# Patient Record
Sex: MALE | Race: WHITE | NOT HISPANIC OR LATINO | Employment: FULL TIME | URBAN - METROPOLITAN AREA
[De-identification: names, ages, dates, MRNs, and addresses within clinical notes are randomized per-mention and may not be internally consistent; named-entity substitution may affect disease eponyms.]

---

## 2018-10-18 ENCOUNTER — HOSPITAL ENCOUNTER (OUTPATIENT)
Dept: OTHER | Facility: HOSPITAL | Age: 28
Discharge: HOME | End: 2018-10-18

## 2020-11-18 ENCOUNTER — HOSPITAL ENCOUNTER (OUTPATIENT)
Dept: OTHER | Facility: HOSPITAL | Age: 30
Discharge: HOME | End: 2020-11-18

## 2024-07-23 ENCOUNTER — PATIENT ROUNDING (BHMG ONLY) (OUTPATIENT)
Dept: NEUROLOGY | Facility: CLINIC | Age: 34
End: 2024-07-23
Payer: COMMERCIAL

## 2024-07-23 ENCOUNTER — LAB (OUTPATIENT)
Dept: LAB | Facility: HOSPITAL | Age: 34
End: 2024-07-23
Payer: COMMERCIAL

## 2024-07-23 ENCOUNTER — OFFICE VISIT (OUTPATIENT)
Dept: NEUROLOGY | Facility: CLINIC | Age: 34
End: 2024-07-23
Payer: COMMERCIAL

## 2024-07-23 VITALS
BODY MASS INDEX: 36.57 KG/M2 | DIASTOLIC BLOOD PRESSURE: 85 MMHG | WEIGHT: 285 LBS | SYSTOLIC BLOOD PRESSURE: 157 MMHG | HEART RATE: 60 BPM | HEIGHT: 74 IN

## 2024-07-23 DIAGNOSIS — G35 MULTIPLE SCLEROSIS: Primary | ICD-10-CM

## 2024-07-23 DIAGNOSIS — G35 MULTIPLE SCLEROSIS: ICD-10-CM

## 2024-07-23 LAB
ALBUMIN SERPL-MCNC: 4.2 G/DL (ref 3.5–5.2)
ALBUMIN/GLOB SERPL: 1.4 G/DL
ALP SERPL-CCNC: 103 U/L (ref 39–117)
ALT SERPL W P-5'-P-CCNC: 34 U/L (ref 1–41)
ANION GAP SERPL CALCULATED.3IONS-SCNC: 12 MMOL/L (ref 5–15)
APTT PPP: 29.5 SECONDS (ref 24.2–34.2)
AST SERPL-CCNC: 22 U/L (ref 1–40)
BASOPHILS # BLD AUTO: 0.06 10*3/MM3 (ref 0–0.2)
BASOPHILS NFR BLD AUTO: 0.8 % (ref 0–1.5)
BILIRUB SERPL-MCNC: 0.7 MG/DL (ref 0–1.2)
BUN SERPL-MCNC: 9 MG/DL (ref 6–20)
BUN/CREAT SERPL: 9.1 (ref 7–25)
CALCIUM SPEC-SCNC: 9.1 MG/DL (ref 8.6–10.5)
CHLORIDE SERPL-SCNC: 102 MMOL/L (ref 98–107)
CO2 SERPL-SCNC: 23 MMOL/L (ref 22–29)
CREAT SERPL-MCNC: 0.99 MG/DL (ref 0.76–1.27)
DEPRECATED RDW RBC AUTO: 41.2 FL (ref 37–54)
EGFRCR SERPLBLD CKD-EPI 2021: 103.2 ML/MIN/1.73
EOSINOPHIL # BLD AUTO: 0.51 10*3/MM3 (ref 0–0.4)
EOSINOPHIL NFR BLD AUTO: 6.7 % (ref 0.3–6.2)
ERYTHROCYTE [DISTWIDTH] IN BLOOD BY AUTOMATED COUNT: 13.3 % (ref 12.3–15.4)
GLOBULIN UR ELPH-MCNC: 2.9 GM/DL
GLUCOSE SERPL-MCNC: 237 MG/DL (ref 65–99)
HCT VFR BLD AUTO: 46.3 % (ref 37.5–51)
HGB BLD-MCNC: 15.4 G/DL (ref 13–17.7)
IGG1 SER-MCNC: 1148 MG/DL (ref 700–1600)
IMM GRANULOCYTES # BLD AUTO: 0.03 10*3/MM3 (ref 0–0.05)
IMM GRANULOCYTES NFR BLD AUTO: 0.4 % (ref 0–0.5)
INR PPP: 1 (ref 0.86–1.15)
LYMPHOCYTES # BLD AUTO: 2.97 10*3/MM3 (ref 0.7–3.1)
LYMPHOCYTES NFR BLD AUTO: 39 % (ref 19.6–45.3)
MCH RBC QN AUTO: 28.4 PG (ref 26.6–33)
MCHC RBC AUTO-ENTMCNC: 33.3 G/DL (ref 31.5–35.7)
MCV RBC AUTO: 85.4 FL (ref 79–97)
MONOCYTES # BLD AUTO: 0.41 10*3/MM3 (ref 0.1–0.9)
MONOCYTES NFR BLD AUTO: 5.4 % (ref 5–12)
NEUTROPHILS NFR BLD AUTO: 3.63 10*3/MM3 (ref 1.7–7)
NEUTROPHILS NFR BLD AUTO: 47.7 % (ref 42.7–76)
NRBC BLD AUTO-RTO: 0 /100 WBC (ref 0–0.2)
PLATELET # BLD AUTO: 350 10*3/MM3 (ref 140–450)
PMV BLD AUTO: 9.4 FL (ref 6–12)
POTASSIUM SERPL-SCNC: 3.7 MMOL/L (ref 3.5–5.2)
PROT SERPL-MCNC: 7.1 G/DL (ref 6–8.5)
PROTHROMBIN TIME: 13.4 SECONDS (ref 11.8–14.9)
RBC # BLD AUTO: 5.42 10*6/MM3 (ref 4.14–5.8)
SODIUM SERPL-SCNC: 137 MMOL/L (ref 136–145)
WBC NRBC COR # BLD AUTO: 7.61 10*3/MM3 (ref 3.4–10.8)

## 2024-07-23 PROCEDURE — 80053 COMPREHEN METABOLIC PANEL: CPT

## 2024-07-23 PROCEDURE — 86051 AQUAPORIN-4 ANTB ELISA: CPT

## 2024-07-23 PROCEDURE — 86362 MOG-IGG1 ANTB CBA EACH: CPT

## 2024-07-23 PROCEDURE — 86706 HEP B SURFACE ANTIBODY: CPT

## 2024-07-23 PROCEDURE — 36415 COLL VENOUS BLD VENIPUNCTURE: CPT

## 2024-07-23 PROCEDURE — 87340 HEPATITIS B SURFACE AG IA: CPT

## 2024-07-23 PROCEDURE — 85610 PROTHROMBIN TIME: CPT

## 2024-07-23 PROCEDURE — 85025 COMPLETE CBC W/AUTO DIFF WBC: CPT

## 2024-07-23 PROCEDURE — 85730 THROMBOPLASTIN TIME PARTIAL: CPT

## 2024-07-23 PROCEDURE — 82784 ASSAY IGA/IGD/IGG/IGM EACH: CPT

## 2024-07-23 PROCEDURE — 86704 HEP B CORE ANTIBODY TOTAL: CPT

## 2024-07-23 NOTE — PROGRESS NOTES
"Chief Complaint  Neurologic Problem (Evaluate for MS, MRI w/wo 2020 Brain. )    Subjective          Thiago Arevalo is a 34 y.o. male who presents to [facility]OLOG[facility] & NEUROSURGERY  History of Present Illness  34-year-old man evaluated for multiple sclerosis.  He states that he had sclerosis since he lost vision in his left eye in 2016 and he had problems seeing below which was like looking through static and that lasted for 2 years before he lost vision in his right eye.  In 2018 he lost vision in his right eye and became blind he can see some movement and light.  In 2012 13 his right side lost strength in his left side felt like there was cold sensation does intense.  He has never been treated for multiple sclerosis and he was seeing another neurologist in the past.  MRI of the cervical spine that was performed in 2018 shows scattered tiny hyperintense T2 signal lesions throughout the cervical cord suggesting of a chronic multiple sclerosis plaque.  MRI of the brain November 2020 shows findings consistent with provided clinical diagnosis of multiple sclerosis.  There is 1 punctuate focus of enhancement in the right posterior frontal centrum semiovale consistent with a focus of active demyelination.    He states that he is independent with activities of daily living.  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  His wife is with him today.  They have no children.    Objective   Vital Signs:   /85   Pulse 60   Ht 188 cm (74.02\")   Wt 129 kg (285 lb)   BMI 36.58 kg/m²     Physical Exam   He is alert, fluent, phasic, follows commands well.  Optic disk is pale in the right side with afferent pupillary defect.  Optic disc the left side is pale as well but there is no afferent pupillary defect and pupils are equal briskly reactive to light.  There is exotropia of the right eye.  EOMs are full, visual fields full, facial strength is full, soft palate elevation and " tongue are normal.  There is no weakness of the upper or lower extremities.  Fine finger movements are intact.  Reflex symmetrically decreased.  Station gait is able to tiptoe, heel walk, melvi and has difficulty with tandem.  Heart is regular and rhythm normal in rate.        Assessment and Plan  Diagnoses and all orders for this visit:    1. Multiple sclerosis (Primary)  -     NMO IgG Autoantibodies; Future  -     aPTT; Future  -     Protime-INR; Future  -     Anti-Myelin Oligodendrocyte Glycoprotein (MOG), Serum; Future  -     CBC & Differential; Future  -     Comprehensive Metabolic Panel; Future  -     Hepatitis B Virus (HBV) Screening and Diagnosis; Future  -     IgG; Future  -     IR Lumbar Puncture Diagnosis; Future  -     Obtain Informed Consent; Standing  -     Notify Provider if Patient Taking Blood Thinning Agents; Standing  -     CBC (No Diff); Standing  -     Glucose, CSF - Cerebrospinal Fluid, Lumbar Puncture; Standing  -     Protein, CSF - Cerebrospinal Fluid, Lumbar Puncture; Standing  -     Cell Count With Differential, CSF Use CSF Tube: 1; Standing  -     Cell Count With Differential, CSF; Standing  -     CSF Tube - Cerebrospinal Fluid, Lumbar Puncture; Standing  -     CSF Tube - Cerebrospinal Fluid, Lumbar Puncture; Standing  -     MS Profile & MBP, CSF and Serum - Cerebrospinal Fluid, Lumbar Puncture; Standing  -     Oligoclonal Banding (COLLECT RED TUBE); Standing         Total time spent with the patient and coordinating patient care was 60 minutes.    Follow Up  No follow-ups on file.  Patient was given instructions and counseling regarding his condition or for health maintenance advice. Please see specific information pulled into the AVS if appropriate.

## 2024-07-23 NOTE — ASSESSMENT & PLAN NOTE
He has untreated multiple sclerosis.  I discussed with him that I will repeat an MRI of the brain, cervical spine and thoracic spine and obtain a lumbar puncture under fluoroscopy.  I explained to him the complications including post spinal headache requiring an epidural blood patch and almost 0 possibility of spinal complications including paralysis, CSF leak, CNS infection and death.  I will obtain laboratory workup and I will see him again after this is perform and start him on Kesimpta.  I discussed with him and his wife that he should not be overheated since it may exacerbate an attack.  Thank you for let me participate in his care.

## 2024-07-24 LAB
HBV CORE AB SERPL QL IA: NEGATIVE
HBV SURFACE AB SER QL: REACTIVE
HBV SURFACE AG SERPL QL IA: NEGATIVE
IMP & REVIEW OF LAB RESULTS: NORMAL
LABORATORY COMMENT REPORT: NORMAL

## 2024-07-25 LAB — MOG AB SER QL CBA IFA: NEGATIVE

## 2024-07-26 LAB — AQP4 H2O CHANNEL IGG SERPL IA-ACNC: <1.5 U/ML (ref 0–3)

## 2024-08-08 ENCOUNTER — LAB (OUTPATIENT)
Dept: LAB | Facility: HOSPITAL | Age: 34
End: 2024-08-08
Payer: COMMERCIAL

## 2024-08-08 ENCOUNTER — HOSPITAL ENCOUNTER (OUTPATIENT)
Dept: INTERVENTIONAL RADIOLOGY/VASCULAR | Facility: HOSPITAL | Age: 34
Discharge: HOME | End: 2024-08-08
Payer: COMMERCIAL

## 2024-08-08 VITALS
HEART RATE: 61 BPM | DIASTOLIC BLOOD PRESSURE: 89 MMHG | OXYGEN SATURATION: 95 % | SYSTOLIC BLOOD PRESSURE: 145 MMHG | RESPIRATION RATE: 20 BRPM

## 2024-08-08 DIAGNOSIS — G35 MULTIPLE SCLEROSIS: ICD-10-CM

## 2024-08-08 LAB
APPEARANCE CSF: CLEAR
APPEARANCE CSF: CLEAR
COLOR CSF: COLORLESS
COLOR CSF: COLORLESS
DEPRECATED RDW RBC AUTO: 40.4 FL (ref 37–54)
ERYTHROCYTE [DISTWIDTH] IN BLOOD BY AUTOMATED COUNT: 13.1 % (ref 12.3–15.4)
GLUCOSE CSF-MCNC: 93 MG/DL (ref 40–70)
HCT VFR BLD AUTO: 44.1 % (ref 37.5–51)
HGB BLD-MCNC: 14.7 G/DL (ref 13–17.7)
HOLD SPECIMEN: NORMAL
HOLD SPECIMEN: NORMAL
MCH RBC QN AUTO: 28.5 PG (ref 26.6–33)
MCHC RBC AUTO-ENTMCNC: 33.3 G/DL (ref 31.5–35.7)
MCV RBC AUTO: 85.6 FL (ref 79–97)
NUC CELL # CSF MANUAL: 2.22 /MM3 (ref 0–5)
NUC CELL # CSF MANUAL: 3.33 /MM3 (ref 0–5)
PLATELET # BLD AUTO: 312 10*3/MM3 (ref 140–450)
PMV BLD AUTO: 8.7 FL (ref 6–12)
PROT CSF-MCNC: 25.7 MG/DL (ref 15–45)
RBC # BLD AUTO: 5.15 10*6/MM3 (ref 4.14–5.8)
RBC # CSF MANUAL: 0 /MM3
RBC # CSF MANUAL: 7.78 /MM3
TUBE # CSF: 1
TUBE # CSF: 4
WBC NRBC COR # BLD AUTO: 7.67 10*3/MM3 (ref 3.4–10.8)
XANTHOCHROMIA FLD QL: NORMAL
XANTHOCHROMIA FLD QL: NORMAL

## 2024-08-08 PROCEDURE — 83873 ASSAY OF CSF PROTEIN: CPT | Performed by: PSYCHIATRY & NEUROLOGY

## 2024-08-08 PROCEDURE — 82784 ASSAY IGA/IGD/IGG/IGM EACH: CPT | Performed by: PSYCHIATRY & NEUROLOGY

## 2024-08-08 PROCEDURE — 83916 OLIGOCLONAL BANDS: CPT | Performed by: PSYCHIATRY & NEUROLOGY

## 2024-08-08 PROCEDURE — 89050 BODY FLUID CELL COUNT: CPT | Performed by: PSYCHIATRY & NEUROLOGY

## 2024-08-08 PROCEDURE — 85027 COMPLETE CBC AUTOMATED: CPT | Performed by: PSYCHIATRY & NEUROLOGY

## 2024-08-08 PROCEDURE — 82945 GLUCOSE OTHER FLUID: CPT | Performed by: PSYCHIATRY & NEUROLOGY

## 2024-08-08 PROCEDURE — 84157 ASSAY OF PROTEIN OTHER: CPT | Performed by: PSYCHIATRY & NEUROLOGY

## 2024-08-08 PROCEDURE — 82040 ASSAY OF SERUM ALBUMIN: CPT | Performed by: PSYCHIATRY & NEUROLOGY

## 2024-08-08 PROCEDURE — 82042 OTHER SOURCE ALBUMIN QUAN EA: CPT | Performed by: PSYCHIATRY & NEUROLOGY

## 2024-08-08 RX ORDER — LIDOCAINE HYDROCHLORIDE 20 MG/ML
20 INJECTION, SOLUTION INFILTRATION; PERINEURAL ONCE
Status: COMPLETED | OUTPATIENT
Start: 2024-08-08 | End: 2024-08-08

## 2024-08-08 RX ADMIN — LIDOCAINE HYDROCHLORIDE 9 ML: 20 INJECTION, SOLUTION INFILTRATION; PERINEURAL at 10:41

## 2024-08-08 RX ADMIN — SODIUM BICARBONATE 1 ML: 84 INJECTION, SOLUTION INTRAVENOUS at 10:41

## 2024-08-13 LAB
ALB CSF/SERPL: 4 {RATIO} (ref 0–8)
ALBUMIN CSF-MCNC: 18 MG/DL (ref 10–45)
ALBUMIN SERPL-MCNC: 4.5 G/DL (ref 4.1–5.1)
IGG CSF-MCNC: 4.4 MG/DL (ref 0–10.3)
IGG SERPL-MCNC: 1199 MG/DL (ref 603–1613)
IGG SYNTH RATE SER+CSF CALC-MRATE: 6.7 MG/DAY
IGG/ALB CLEAR SER+CSF-RTO: 0.9 (ref 0–0.7)
IGG/ALB CSF: 0.24 {RATIO} (ref 0–0.25)
MBP CSF-MCNC: 1.5 NG/ML (ref 0–3.8)
OLIGOCLONAL BANDS.IT SER+CSF QL: ABNORMAL

## 2024-08-16 ENCOUNTER — HOSPITAL ENCOUNTER (OUTPATIENT)
Dept: MRI IMAGING | Facility: HOSPITAL | Age: 34
Discharge: HOME | End: 2024-08-16
Payer: COMMERCIAL

## 2024-08-16 DIAGNOSIS — G35 MULTIPLE SCLEROSIS: ICD-10-CM

## 2024-08-16 PROCEDURE — 0 GADOBENATE DIMEGLUMINE 529 MG/ML SOLUTION: Performed by: PSYCHIATRY & NEUROLOGY

## 2024-08-16 PROCEDURE — 72156 MRI NECK SPINE W/O & W/DYE: CPT

## 2024-08-16 PROCEDURE — A9577 INJ MULTIHANCE: HCPCS | Performed by: PSYCHIATRY & NEUROLOGY

## 2024-08-16 PROCEDURE — 72157 MRI CHEST SPINE W/O & W/DYE: CPT

## 2024-08-16 PROCEDURE — 70553 MRI BRAIN STEM W/O & W/DYE: CPT

## 2024-08-16 RX ADMIN — GADOBENATE DIMEGLUMINE 20 ML: 529 INJECTION, SOLUTION INTRAVENOUS at 15:05

## 2024-08-23 ENCOUNTER — OFFICE VISIT (OUTPATIENT)
Dept: NEUROLOGY | Facility: CLINIC | Age: 34
End: 2024-08-23
Payer: COMMERCIAL

## 2024-08-23 ENCOUNTER — PRIOR AUTHORIZATION (OUTPATIENT)
Dept: NEUROLOGY | Facility: CLINIC | Age: 34
End: 2024-08-23

## 2024-08-23 VITALS
DIASTOLIC BLOOD PRESSURE: 72 MMHG | SYSTOLIC BLOOD PRESSURE: 149 MMHG | BODY MASS INDEX: 36.45 KG/M2 | WEIGHT: 284 LBS | HEIGHT: 74 IN | HEART RATE: 67 BPM

## 2024-08-23 DIAGNOSIS — G35 MULTIPLE SCLEROSIS: Primary | ICD-10-CM

## 2024-08-23 PROCEDURE — 99214 OFFICE O/P EST MOD 30 MIN: CPT | Performed by: PSYCHIATRY & NEUROLOGY

## 2024-08-23 RX ORDER — OFATUMUMAB 20 MG/.4ML
20 INJECTION, SOLUTION SUBCUTANEOUS TAKE AS DIRECTED
Qty: 1.2 ML | Refills: 0 | COMMUNITY
Start: 2024-08-23

## 2024-08-23 NOTE — ASSESSMENT & PLAN NOTE
I discussed with him regarding the findings of relapsing remitting multiple sclerosis clinically and he has radiological and CSF evidence of multiple sclerosis.  I josiah with him that I will start him on Kesimpta and discussed with him regarding the administration and adverse effects.  They are worse adverse effects is related to increased risk of infection, suppression of his bone marrow, hyper sensitivity reactions.  We will follow his CBC and comprehensive medical profile in 2 months.  He was given instructions by our nurse how to self administer the medication and he was given the first shot of Kesimpta.  I will see him again in 2 months time for follow-up.

## 2024-08-23 NOTE — PROGRESS NOTES
"Chief Complaint  Results (LP & imaging results. )    Subjective          Thiago Arevalo is a 34 y.o. male who presents to [facility] NEUROLOGY & NEUROSURGERY  History of Present Illness  33-year-old man here for follow-up of his multiple sclerosis.  I reviewed the MRI of the brain, cervical spine and thoracic spine and there are no active lesions.  He had laboratory workup which his hepatitis B is unremarkable.  IgG is normal.  He had 6 bands in the spinal fluid.    He has not had an exacerbation or an attack since he went blind in his right eye.    Objective   Vital Signs:   /72   Pulse 67   Ht 188 cm (74.02\")   Wt 129 kg (284 lb)   BMI 36.45 kg/m²     Physical Exam   Alert, fluent, phasic, follows commands well.  Heart is regular rhythm normal in rate.        Assessment and Plan  Diagnoses and all orders for this visit:    1. Multiple sclerosis (Primary)  Assessment & Plan:  I discussed with him regarding the findings of relapsing remitting multiple sclerosis clinically and he has radiological and CSF evidence of multiple sclerosis.  I josiah with him that I will start him on Kesimpta and discussed with him regarding the administration and adverse effects.  They are worse adverse effects is related to increased risk of infection, suppression of his bone marrow, hyper sensitivity reactions.  We will follow his CBC and comprehensive medical profile in 2 months.  He was given instructions by our nurse how to self administer the medication and he was given the first shot of Kesimpta.  I will see him again in 2 months time for follow-up.           Total time spent with the patient and coordinating patient care was 30 minutes.    Follow Up  No follow-ups on file.  Patient was given instructions and counseling regarding his condition or for health maintenance advice. Please see specific information pulled into the AVS if appropriate.       "

## 2024-08-27 RX ORDER — OFATUMUMAB 20 MG/.4ML
20 INJECTION, SOLUTION SUBCUTANEOUS
Qty: 1.2 ML | Refills: 1 | Status: SHIPPED | OUTPATIENT
Start: 2024-08-27

## 2024-10-18 ENCOUNTER — TELEPHONE (OUTPATIENT)
Dept: NEUROLOGY | Facility: CLINIC | Age: 34
End: 2024-10-18
Payer: COMMERCIAL

## 2024-10-18 NOTE — TELEPHONE ENCOUNTER
"Patient came in office with concern of \"lump in left armpit\" he was not sure if he should administer Kesimpta this Marco A 10/20/24 or not. Was seen by provider.  "

## 2024-10-30 ENCOUNTER — OFFICE VISIT (OUTPATIENT)
Dept: NEUROLOGY | Facility: CLINIC | Age: 34
End: 2024-10-30
Payer: COMMERCIAL

## 2024-10-30 VITALS
SYSTOLIC BLOOD PRESSURE: 163 MMHG | DIASTOLIC BLOOD PRESSURE: 82 MMHG | HEIGHT: 74 IN | WEIGHT: 292 LBS | BODY MASS INDEX: 37.47 KG/M2 | HEART RATE: 66 BPM

## 2024-10-30 DIAGNOSIS — G35 MULTIPLE SCLEROSIS: Primary | ICD-10-CM

## 2024-10-30 PROCEDURE — 99213 OFFICE O/P EST LOW 20 MIN: CPT | Performed by: PSYCHIATRY & NEUROLOGY

## 2024-10-30 NOTE — ASSESSMENT & PLAN NOTE
He is to continue taking Kesimpta injections. I will see him again in 6 months time for follow-up.

## 2024-10-30 NOTE — PROGRESS NOTES
"Chief Complaint  Follow-up (Kesimpta check)    Subjective          Thiago Arevalo is a 34 y.o. male who presents to [facility] NEUROLOGY & NEUROSURGERY  History of Present Illness  34-year-old man here for follow-up of his multiple sclerosis and a lump in his left axilla.  He states that he saw his primary care and is no longer there.  He did a Kesimpta injection.  He has no adverse effects.  He states that he has blindness in the right eye and partial blindness in the left eye.  He has had right sided exotropia since 2018.  He is not seeing double.  He can drive very well.  He is asking for handicap placard.    Objective   Vital Signs:   /82   Pulse 66   Ht 188 cm (74.02\")   Wt 132 kg (292 lb)   BMI 37.47 kg/m²     Physical Exam   Right, fluent, phasic, follows commands well.  Optic this are pale bilaterally.  There is an afferent pupillary defect on the left eye.  The right eye is paler than the left eye.  He cannot see as well on the right eye.  EOMs admitted on the right eye on looking medially and there is exotropia on the right eye.  There is no focal weakness of the upper or lower extremities.  Station and gait is unremarkable.  Heart is regular rhythm normal in rate..        Assessment and Plan  Diagnoses and all orders for this visit:    1. Multiple sclerosis (Primary)  Assessment & Plan:  He is to continue taking Kesimpta injections. I will see him again in 6 months time for follow-up.           Total time spent with the patient and coordinating patient care was 25 minutes.    Follow Up  No follow-ups on file.  Patient was given instructions and counseling regarding his condition or for health maintenance advice. Please see specific information pulled into the AVS if appropriate.       "

## 2025-02-26 ENCOUNTER — TRANSCRIBE ORDERS (OUTPATIENT)
Dept: ADMINISTRATIVE | Facility: HOSPITAL | Age: 35
End: 2025-02-26
Payer: COMMERCIAL

## 2025-02-26 DIAGNOSIS — R22.9 SUBCUTANEOUS MASS: Primary | ICD-10-CM

## 2025-03-13 ENCOUNTER — HOSPITAL ENCOUNTER (OUTPATIENT)
Dept: MAMMOGRAPHY | Facility: HOSPITAL | Age: 35
Discharge: HOME | End: 2025-03-13
Payer: COMMERCIAL

## 2025-03-13 ENCOUNTER — HOSPITAL ENCOUNTER (OUTPATIENT)
Dept: ULTRASOUND IMAGING | Facility: HOSPITAL | Age: 35
Discharge: HOME | End: 2025-03-13
Payer: COMMERCIAL

## 2025-03-13 DIAGNOSIS — R22.9 SUBCUTANEOUS MASS: ICD-10-CM

## 2025-03-13 PROCEDURE — 76642 ULTRASOUND BREAST LIMITED: CPT

## 2025-03-13 PROCEDURE — 77066 DX MAMMO INCL CAD BI: CPT

## 2025-03-13 PROCEDURE — G0279 TOMOSYNTHESIS, MAMMO: HCPCS

## 2025-05-05 ENCOUNTER — OFFICE VISIT (OUTPATIENT)
Dept: NEUROLOGY | Facility: CLINIC | Age: 35
End: 2025-05-05
Payer: COMMERCIAL

## 2025-05-05 VITALS
HEIGHT: 74 IN | SYSTOLIC BLOOD PRESSURE: 151 MMHG | DIASTOLIC BLOOD PRESSURE: 83 MMHG | HEART RATE: 63 BPM | WEIGHT: 293 LBS | BODY MASS INDEX: 37.6 KG/M2

## 2025-05-05 DIAGNOSIS — G47.33 OBSTRUCTIVE SLEEP APNEA: ICD-10-CM

## 2025-05-05 DIAGNOSIS — G35 MULTIPLE SCLEROSIS: Primary | ICD-10-CM

## 2025-05-05 PROCEDURE — 99214 OFFICE O/P EST MOD 30 MIN: CPT | Performed by: PSYCHIATRY & NEUROLOGY

## 2025-05-05 NOTE — PROGRESS NOTES
"Chief Complaint  Follow-up (/Med check- Kesimpta.)    Subjective          Thiago Arevalo is a 34 y.o. male who presents to [facility] NEUROLOGY & NEUROSURGERY  History of Present Illness      History of Present Illness  The patient presents for evaluation of multiple sclerosis and sleep disturbances.    He has not been treated for multiple sclerosis for a long time. He has been receiving Kesimpta infusions for the past 6 years, which he finds more convenient as they can be administered at home. He reports no significant changes in his condition since his last visit.    His wife has observed that he experiences twitching episodes during sleep. He reports feeling well-rested when he achieves a full 8 hours of sleep but often feels fatigued due to only getting approximately 5 hours of sleep per night. 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 He rarely has difficulty initiating sleep and generally sleeps through the night without interruption. However, he tends to go to bed late and wakes up early at 5:30 AM. His wife has reported instances of him jerking during sleep, which have disrupted her rest.    INTERVAL: Since last visit, he reports no significant changes in his condition.    SOCIAL HISTORY  Marital Status:   Occupations: Teacher  Sleep: Average of 5 hours per night, feels tired often, occasionally gets a full 8-hour sleep    MEDICATIONS  CURRENT MEDS:  Kesimpta  PREVIOUS MEDS:  Ocrevus      Objective   Vital Signs:   /83 (BP Location: Left arm, Patient Position: Sitting, Cuff Size: Adult)   Pulse 63   Ht 188 cm (74.02\")   Wt 133 kg (293 lb)   BMI 37.60 kg/m²     Physical Exam   Alert, fluent, phasic, follows commands well.  Rapid altering movements, tapping of the upper and lower extremities is symmetrical and intact.  Timed gait is 8 seconds 25 feet.  Finger-nose testing is intact.  Heart is regular rhythm normal rate.   "   Results           Assessment and Plan  Diagnoses and all orders for this visit:    1. Multiple sclerosis (Primary)  -     CBC & Differential; Future  -     IgG; Future  -     Ambulatory Referral to Sleep Medicine  -     MRI Brain With & Without Contrast; Future  -     MRI Cervical Spine With & Without Contrast; Future    2. Obstructive sleep apnea  -     Ambulatory Referral to Sleep Medicine         Assessment & Plan  1. Multiple Sclerosis.  He has not been treated for multiple sclerosis for a long time. He has been receiving Kesimpta infusions for the past 6 years. He is advised to continue his current medication regimen. An MRI is scheduled for July 2025 to monitor the progression of the disease. If the MRI shows no new lesions, the frequency of MRIs may be reduced to every two years. A complete blood count and IgG test will be conducted concurrently with the MRI.    2. Sleep Disturbances.  He reports experiencing twitching during sleep, which may be indicative of periodic limb movement disorder (PLMD). A referral to a sleep specialist will be made for further evaluation and potential treatment of PLMD. A sleep study will be conducted to rule out sleep apnea or other sleep disorders.      Total time spent with the patient and coordinating patient care was 30 minutes.    Follow Up  No follow-ups on file.  Patient was given instructions and counseling regarding his condition or for health maintenance advice. Please see specific information pulled into the AVS if appropriate.     Patient or patient representative verbalized consent for the use of Ambient Listening during the visit with  Howard Fairbanks MD for chart documentation. 5/5/2025  14:45 EDT

## 2025-06-12 ENCOUNTER — HOSPITAL ENCOUNTER (OUTPATIENT)
Dept: MRI IMAGING | Facility: HOSPITAL | Age: 35
Discharge: HOME | End: 2025-06-12
Payer: COMMERCIAL

## 2025-06-12 DIAGNOSIS — G35 MULTIPLE SCLEROSIS: ICD-10-CM

## 2025-06-12 PROCEDURE — 25510000001 GADOPICLENOL 0.5 MMOL/ML SOLUTION: Performed by: PSYCHIATRY & NEUROLOGY

## 2025-06-12 PROCEDURE — A9573 GADOPICLENOL 0.5 MMOL/ML SOLUTION: HCPCS | Performed by: PSYCHIATRY & NEUROLOGY

## 2025-06-12 PROCEDURE — 70553 MRI BRAIN STEM W/O & W/DYE: CPT

## 2025-06-12 PROCEDURE — 72156 MRI NECK SPINE W/O & W/DYE: CPT

## 2025-06-12 RX ADMIN — GADOPICLENOL 10 ML: 485.1 INJECTION INTRAVENOUS at 13:07

## 2025-07-15 ENCOUNTER — OFFICE VISIT (OUTPATIENT)
Dept: SLEEP MEDICINE | Facility: HOSPITAL | Age: 35
End: 2025-07-15
Payer: COMMERCIAL

## 2025-07-15 VITALS — HEART RATE: 64 BPM | WEIGHT: 292.6 LBS | HEIGHT: 74 IN | OXYGEN SATURATION: 97 % | BODY MASS INDEX: 37.55 KG/M2

## 2025-07-15 DIAGNOSIS — G47.8 NON-RESTORATIVE SLEEP: ICD-10-CM

## 2025-07-15 DIAGNOSIS — G35 MULTIPLE SCLEROSIS: Primary | ICD-10-CM

## 2025-07-15 DIAGNOSIS — R25.3 JERKING: ICD-10-CM

## 2025-07-15 DIAGNOSIS — G47.9 SLEEP DISTURBANCE: ICD-10-CM

## 2025-07-15 DIAGNOSIS — R06.83 SNORING: ICD-10-CM

## 2025-07-15 PROCEDURE — G0463 HOSPITAL OUTPT CLINIC VISIT: HCPCS

## 2025-07-15 RX ORDER — CITALOPRAM HYDROBROMIDE 40 MG/1
40 TABLET ORAL
COMMUNITY
Start: 2025-05-26

## 2025-07-15 NOTE — PROGRESS NOTES
[facility] [address]   [place] [address]  Phone: [phone]  Fax: [phone]     Thiago Arevalo  [id number]   1990  35 y.o.  male      Referring Provider: Dr. Howard Fairbanks MD  PCP: Gabi Andrade MD    Type of service: Initial Sleep Medicine Consult  Date of service: 7/15/2025          CHIEF COMPLAINT: Suspected sleep apnea, muscle jerking at night      HISTORY OF PRESENT ILLNESS:  Thiago Arevalo 35 y.o. was seen today on 7/15/2025 at [facility].    Patient has a history of multiple sclerosis, anxiety, type 2 diabetes, for which the patient follows with outside providers.   Patient has no history of tonsillectomy, adenoidectomy, nasal surgery, UPPP.   Patient presents today with symptoms of snoring, non-restorative sleep, and risk factors concerning for sleep apnea.  Wakes up tired, not restored.  Does take a nap most days.  The symptoms are chronic in nature.  Was also referred here by neurology due to some muscle jerking at night.  Patient reports he actually notices this when he lays down at night, before falling asleep.  Not sure if also occurring while sleeping.  Will check sleep study but also recommended continuing to follow with neurology on this as well.  Patient does report he had recent brain MRI through neurology which patient reports showed no significant changes from previous.          SLEEP HISTORY:  Sleep schedule:  Bedtime: 11 PM weeknights, midnight weekends  Wake time: 5:30 AM weekdays, 630 weekends  Time it takes to fall asleep: 5 to 10 minutes  Average hours of sleep: 5-week nights, 6 weekends  Number of naps per day: 1    Symptoms:   In addition to the above, patient reports the following associated symptoms:  Have you ever awakened gasping for breath, coughing, choking: Yes   Change in weight:  No   Morning headaches:  No   Awaken with a sore throat or dry mouth:  Yes   Leg jerking at night:  Yes, and sometimes arms  Creepy  "crawly feeling in legs/urge to move legs: No   Teeth grinding: Yes   Have you ever awakened at night with a sour taste or burning sensation in your chest:  No   Do you have muscle weakness with laughing or anger:  No   Have you ever felt paralyzed while going to sleep or waking up:  No   Sleepwalking: No   Nightmares: No   Nocturia (urination at night): 0-1 times per night  Memory Problem: No     Medical Conditions (PMH):   Multiple sclerosis  Anxiety  Type 2 diabetes    Social history:  Do you drive a commercial vehicle:  No   Shift work:  No   Tobacco use:  No   Alcohol use: 0-1 per week  Caffeinated drinks: 2-3 per day    Family History (parents and siblings) (pertaining to sleep medicine):  Sleep apnea    Medications: reviewed    Allergies:  Patient has no known allergies.      REVIEW OF SYSTEMS:  Pertinent positive symptoms are:  Snoring  [place] Sleepiness Scale of Total score: 8   Fatigue  Anxiety and depression: Follows with outside provider on this  Nasal congestion      PHYSICAL EXAM:  CONSTITUTINONAL:   Vitals:    07/15/25 0900   Pulse: 64   SpO2: 97%   Weight: 133 kg (292 lb 9.6 oz)   Height: 188 cm (74.02\")    Body mass index is 37.55 kg/m².   HEAD: atraumatic, normocephalic   THROAT: tonsils are not enlarged, Mallampati class III  NECK: Neck Circumference: 17.5 inches, trachea is midline  RESPIRATORY SYSTEM: Respirations even, unlabored, normal rate  CARDIOVASULAR SYSTEM: Normal rate, no edema   NEUROLOGICAL SYSTEM: Alert and oriented x 3  PSYCHIATRIC SYSTEM: Mood is normal/ appropriate     Office note(s) from care team reviewed. Office note(s) reviewed: 5/2025 neurology note reviewed    Labs/ Test Results Reviewed:      3/2025 A1c reviewed.  7.5%.  3/2025 CMP reviewed.  CO2 level within normal limits at 25.  5/2025 brain MRI reviewed.        ASSESSMENT AND PLAN:   At risk for sleep apnea: patient's symptoms and physical examination are concerning for possible sleep apnea.   I discussed the signs, " symptoms, and pathophysiology of sleep apnea with this patient.  I also discussed the possible complications of untreated sleep apnea including but not limited to potential risk of resistant hypertension, insulin resistance, pulmonary hypertension, atrial fibrillation or other arrhythmias, heart attack, stroke, nonrestorative sleep with hypersomnia which can increase risk for motor vehicle accidents, etc.   Different testing methods including home-based and lab based sleep studies were discussed with this patient.   Based on patient history and physical examination, will proceed with in-lab PSG with upper and lower extremity EMG and full EEG.  The order for the sleep study is placed in [facility].  The test will be scheduled after prior authorization has been obtained through patient's insurance.  Discussed overview of treatment options for sleep apnea in the office today including PAP therapy, and treatment/ management will be discussed in more detail with this patient after the test is completed.  All questions were answered to patient's satisfaction.   Snoring, mild: snoring is the sound created by turbulent airflow vibrating upper airway soft tissue.  I have also discussed factors affecting snoring including sleep deprivation, sleeping on the back and alcohol ingestion. To minimize snoring, patient is advised to have adequate sleep, sleep on their side, and avoid alcohol and sedative medications around bedtime.   Fatigue: [place] Sleepiness Scale of Total score: 8.  There are many causes of daytime sleepiness and/or fatigue.  Evaluate for sleep apnea as a possible contributing factor, as above.  Do not drive, operate heavy machinery, or do activities that require high concentration if feeling tired/drowsy.  Severe Obesity w/ comorbidity: Body mass index is 37.55 kg/m².. Patients who are overweight or obese are at increased risk of sleep apnea/ sleep disordered breathing. Weight reduction and healthy lifestyle are  encouraged in overweight/ obese patients as part of a comprehensive approach to sleep apnea treatment.    Limb jerking: Patient notices this when he lays down at night even before falling asleep.  Recommended discussing this further with neurologist.  However patient unsure whether symptoms are persisting into sleep.  Will check in lab PSG, as above.  Will see if we can do upper and lower extremity EMG and full EEG.  If no significant findings on in-lab PSG then would defer back to neurology for further evaluation.    I have also discussed with the patient the following  Adequate amount of sleep: most people need around 7 to 9 hours of sleep each night        Patient will follow-up after study, 31 to 90 days after PAP therapy initiated if applicable, or contact the office sooner for questions or concerns. Patient's questions were answered.            Thank you again for asking me to consult on this patient.  Please do not hesitate to call me if you have additional questions or concerns.       Nellie Mcgowan DNP, APRN  [facility]

## 2025-07-30 ENCOUNTER — PRIOR AUTHORIZATION (OUTPATIENT)
Dept: NEUROLOGY | Facility: CLINIC | Age: 35
End: 2025-07-30
Payer: COMMERCIAL

## 2025-08-22 ENCOUNTER — HOSPITAL ENCOUNTER (OUTPATIENT)
Dept: SLEEP MEDICINE | Facility: HOSPITAL | Age: 35
End: 2025-08-22
Payer: COMMERCIAL

## 2025-08-27 DIAGNOSIS — G47.33 OSA (OBSTRUCTIVE SLEEP APNEA): Primary | ICD-10-CM

## 2025-08-27 DIAGNOSIS — R06.83 SNORING: ICD-10-CM
